# Patient Record
Sex: MALE | Race: OTHER
[De-identification: names, ages, dates, MRNs, and addresses within clinical notes are randomized per-mention and may not be internally consistent; named-entity substitution may affect disease eponyms.]

---

## 2022-10-04 ENCOUNTER — HOSPITAL ENCOUNTER (EMERGENCY)
Dept: HOSPITAL 12 - ER | Age: 27
Discharge: ANOTHER HOSPITAL | End: 2022-10-04
Payer: COMMERCIAL

## 2022-10-04 VITALS — HEIGHT: 67 IN | WEIGHT: 150 LBS | BODY MASS INDEX: 23.54 KG/M2

## 2022-10-04 DIAGNOSIS — R41.82: ICD-10-CM
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
## 2022-10-04 DIAGNOSIS — Z20.822: ICD-10-CM

## 2022-10-04 DIAGNOSIS — T43.652A: Primary | ICD-10-CM

## 2022-10-04 DIAGNOSIS — T41.292A: ICD-10-CM

## 2022-10-04 DIAGNOSIS — R00.0: ICD-10-CM

## 2022-10-04 LAB
AMPHETAMINES UR QL SCN>1000 NG/ML: POSITIVE
APAP SERPL-MCNC: < 2 UG/ML (ref 10–30)
BUN SERPL-MCNC: 25 MG/DL (ref 7–18)
CHLORIDE SERPL-SCNC: 101 MMOL/L (ref 98–107)
CO2 SERPL-SCNC: 25 MMOL/L (ref 21–32)
COCAINE UR QL SCN: NEGATIVE
CREAT SERPL-MCNC: 1 MG/DL (ref 0.6–1.3)
ETHANOL SERPL-MCNC: < 3 MG/DL (ref 0–0)
GLUCOSE SERPL-MCNC: 145 MG/DL (ref 74–106)
HCT VFR BLD AUTO: 44.5 % (ref 36.7–47.1)
MCH RBC QN AUTO: 33.2 UUG (ref 23.8–33.4)
MCV RBC AUTO: 95.5 FL (ref 73–96.2)
OPIATES UR QL SCN: NEGATIVE
PCP UR QL SCN>25 NG/ML: NEGATIVE
PLATELET # BLD AUTO: 224 K/UL (ref 152–348)
POTASSIUM SERPL-SCNC: 3.3 MMOL/L (ref 3.5–5.1)
THC UR QL SCN>50 NG/ML: NEGATIVE

## 2022-10-04 PROCEDURE — 96374 THER/PROPH/DIAG INJ IV PUSH: CPT

## 2022-10-04 PROCEDURE — G0480 DRUG TEST DEF 1-7 CLASSES: HCPCS

## 2022-10-04 PROCEDURE — 96361 HYDRATE IV INFUSION ADD-ON: CPT

## 2022-10-04 PROCEDURE — 80048 BASIC METABOLIC PNL TOTAL CA: CPT

## 2022-10-04 PROCEDURE — 87426 SARSCOV CORONAVIRUS AG IA: CPT

## 2022-10-04 PROCEDURE — 80299 QUANTITATIVE ASSAY DRUG: CPT

## 2022-10-04 PROCEDURE — 96372 THER/PROPH/DIAG INJ SC/IM: CPT

## 2022-10-04 PROCEDURE — 99285 EMERGENCY DEPT VISIT HI MDM: CPT

## 2022-10-04 PROCEDURE — A4663 DIALYSIS BLOOD PRESSURE CUFF: HCPCS

## 2022-10-04 PROCEDURE — 96376 TX/PRO/DX INJ SAME DRUG ADON: CPT

## 2022-10-04 PROCEDURE — 85025 COMPLETE CBC W/AUTO DIFF WBC: CPT

## 2022-10-04 PROCEDURE — 80307 DRUG TEST PRSMV CHEM ANLYZR: CPT

## 2022-10-04 PROCEDURE — 80320 DRUG SCREEN QUANTALCOHOLS: CPT

## 2022-10-04 PROCEDURE — 93005 ELECTROCARDIOGRAM TRACING: CPT

## 2022-10-04 PROCEDURE — 36415 COLL VENOUS BLD VENIPUNCTURE: CPT

## 2022-10-04 PROCEDURE — 70450 CT HEAD/BRAIN W/O DYE: CPT

## 2022-10-04 NOTE — NUR
Patient is resting comfortably on gurney with eyes closed. Patient is waiting 
for his [place] transfer information@this time from [facility].

## 2022-10-04 NOTE — NUR
-------------------------------------------------------------------------------

            *** Note chanelone in EDM - 10/04/22 at 1404 by ALLIE ***             

-------------------------------------------------------------------------------

Patient is seen walking all around ER department. Patient is seen touching the 
hospital equipment despite frequent reminders to stay on bed and not to touch 
the lines/equipment (e.g. [email or web address]@[facility], oxygen meter/suction canister), 
and removing his therapetic lines (e.g. EKG, SpO2 probe, BP cuff). MD notified.

## 2022-10-04 NOTE — NUR
Patient was seen standing on top of another gurney. He is directable but will 
not reliably follow safety contract.  1:1 sitter was ordered by MD. Nursing 
supervisor Luzmaria notified re: need for 1:1 sitter for this patient.

## 2022-10-04 NOTE — NUR
Patient is sleeping, easily arousable, respiration:easy. Updated ambulance 
[id number] per [place] [facility] Kenzie.

## 2022-10-04 NOTE — NUR
Patient is Mary D patient per ER registration staff Lance. I called Ankit,( who is 
on call for Crisis Clinician for ER today ) said that since patient is a 
Mary D, [place] has their own crisis  to address this patient's 
psych issues.

## 2022-10-04 NOTE — NUR
Patient is seen walking all around ER department. Patient is seen touching the 
hospital equipment despite frequent reminders to stay on bed and not to touch 
the lines/equipment (e.g. ambubag@the gurney, oxygen meter/suction canister), 
and removing his therapeutic lines (e.g. EKG, SpO2 probe, BP cuff). MD 
notified.

## 2022-10-04 NOTE — NUR
Patient will transfer to outside Facility: [facility]



Physician: Dr Miracle Stevens



Location: ER department [phone]



RN: Refugio of [facility] accepted nursing [email or web address]



[facility] ambulance ([employer] company) [email or web address]



[place] [facility]: Yohana gave this transfer information

## 2022-10-04 NOTE — NUR
-------------------------------------------------------------------------------

            *** Note undone in [facility]I - 10/04/22 at 1406 by ALLIE ***             

-------------------------------------------------------------------------------

Patient is resting comfortably on [place] with eyes closed. Patient is waiting 
for [facility] transfer information@this time.

## 2022-10-04 NOTE — NUR
Patient ambulated to bathroom with slow gait with one person assist. Patient is 
unable to provide urine still. Another bag of normal saline bag infusing well.

## 2022-10-04 NOTE — NUR
Patient needs medical clearance for possible psych admission. We are now 
waiting for a callback from [facility] for available [place] medical bed and 
assigned [place] nurse.

## 2022-10-04 NOTE — NUR
1st contact with patient: alert, awake and admits to injecting meth prior to 
arrival to ER. Patient's respiration is easy, he moves all extremities, can 
sometimes be directed occasionally. Security assistance was notified.